# Patient Record
Sex: FEMALE | Race: WHITE
[De-identification: names, ages, dates, MRNs, and addresses within clinical notes are randomized per-mention and may not be internally consistent; named-entity substitution may affect disease eponyms.]

---

## 2022-06-28 ENCOUNTER — HOSPITAL ENCOUNTER
Dept: HOSPITAL 49 - FMS | Age: 82
LOS: 1 days | Discharge: HOME HEALTH | End: 2022-06-29
Attending: SURGERY | Admitting: SURGERY
Payer: COMMERCIAL

## 2022-06-28 VITALS — WEIGHT: 143.56 LBS | HEIGHT: 57.99 IN | BODY MASS INDEX: 30.14 KG/M2

## 2022-06-28 DIAGNOSIS — F32.A: ICD-10-CM

## 2022-06-28 DIAGNOSIS — E11.9: ICD-10-CM

## 2022-06-28 DIAGNOSIS — Z79.899: ICD-10-CM

## 2022-06-28 DIAGNOSIS — Z79.82: ICD-10-CM

## 2022-06-28 DIAGNOSIS — Z91.018: ICD-10-CM

## 2022-06-28 DIAGNOSIS — F17.210: ICD-10-CM

## 2022-06-28 DIAGNOSIS — F41.9: ICD-10-CM

## 2022-06-28 DIAGNOSIS — Z79.84: ICD-10-CM

## 2022-06-28 DIAGNOSIS — Z88.2: ICD-10-CM

## 2022-06-28 DIAGNOSIS — K43.6: ICD-10-CM

## 2022-06-28 DIAGNOSIS — K57.30: Primary | ICD-10-CM

## 2022-06-28 DIAGNOSIS — K82.8: ICD-10-CM

## 2022-06-28 LAB
ALBUMIN SERPL-MCNC: 3.5 G/DL (ref 3.4–5)
ALKALINE PHOSHATASE: 71 U/L (ref 46–116)
ALT SERPL-CCNC: 26 U/L (ref 14–59)
AST: 25 U/L (ref 15–37)
BILIRUBIN - TOTAL: 0.3 MG/DL (ref 0.2–1)
BUN SERPL-MCNC: 27 MG/DL (ref 7–18)
BUN/CREAT RATIO (CALC): 26.2 RATIO
CHLORIDE: 100 MMOL/L (ref 98–107)
CO2 (BICARBONATE): 32 MMOL/L (ref 21–32)
CREATININE: 1.03 MG/DL (ref 0.51–0.95)
GLOBULIN (CALCULATION): 3 G/DL
GLUCOSE SERPL-MCNC: 109 MG/DL (ref 74–106)
HCT: 43.6 % (ref 37–47)
HGB BLD-MCNC: 14.4 G/DL (ref 12.5–16)
MCH RBC QN AUTO: 30.1 PG (ref 25–31)
MCHC RBC AUTO-ENTMCNC: 33 G/DL (ref 32–36)
MCV: 91 FL (ref 78–100)
MPV: 11.4 FL (ref 6–9.5)
PLT: 194 K/UL (ref 150–400)
POTASSIUM: 4.5 MMOL/L (ref 3.5–5.1)
RBC: 4.79 M/UL (ref 4.2–5.4)
RDW: 15.7 % (ref 11.5–14)
TOTAL PROTEIN: 6.5 G/DL (ref 6.4–8.2)
WBC: 8.4 K/UL (ref 4–10.5)

## 2022-06-28 PROCEDURE — G0378 HOSPITAL OBSERVATION PER HR: HCPCS

## 2022-06-29 NOTE — NUR
6/29/22 Ms. Cortés lives with her granddaughter, Devika Glass. She has a rw,
wc, 3in1, s. chair, and lift chair. [facility], Nellie Villa,
is the PCP. [facility] is current for nursing. - A referal was made to
[facility] for OT/OT and safety evaluation.

## 2022-06-29 NOTE — NUR
PATIENT RETURNED FROM OR, AWAKE, ALERT.  SITTING UP DRINKING CLEAR SODA.
NO C/O VOICED, DENIES N/V OR WEAKNESS